# Patient Record
Sex: FEMALE | Race: WHITE
[De-identification: names, ages, dates, MRNs, and addresses within clinical notes are randomized per-mention and may not be internally consistent; named-entity substitution may affect disease eponyms.]

---

## 2019-01-03 ENCOUNTER — HOSPITAL ENCOUNTER (OUTPATIENT)
Dept: HOSPITAL 92 - SDC | Age: 24
Discharge: HOME | End: 2019-01-03
Attending: OTOLARYNGOLOGY
Payer: COMMERCIAL

## 2019-01-03 VITALS — BODY MASS INDEX: 18.2 KG/M2

## 2019-01-03 DIAGNOSIS — J35.01: Primary | ICD-10-CM

## 2019-01-03 LAB
PREGS CONTROL BACKGROUND?: (no result)
PREGS CONTROL BAR APPEAR?: YES

## 2019-01-03 PROCEDURE — 84703 CHORIONIC GONADOTROPIN ASSAY: CPT

## 2019-01-03 PROCEDURE — 0CTPXZZ RESECTION OF TONSILS, EXTERNAL APPROACH: ICD-10-PCS | Performed by: OTOLARYNGOLOGY

## 2019-01-03 PROCEDURE — 85014 HEMATOCRIT: CPT

## 2019-01-03 PROCEDURE — 36415 COLL VENOUS BLD VENIPUNCTURE: CPT

## 2019-01-03 PROCEDURE — 96374 THER/PROPH/DIAG INJ IV PUSH: CPT

## 2019-01-03 PROCEDURE — 88304 TISSUE EXAM BY PATHOLOGIST: CPT

## 2019-01-03 NOTE — OP
DATE OF PROCEDURE:  01/03/2019



PREOPERATIVE DIAGNOSES:  Chronic tonsillitis, recurrent tonsillitis.



POSTOPERATIVE DIAGNOSES:  Chronic tonsillitis, recurrent tonsillitis.



PROCEDURE PERFORMED:  Tonsillectomy over 12 years of age.



PROCEDURE IN DETAIL:  After consent was obtained, the patient was identified,

brought to the operating room, and placed on the operating table in the supine

position.  General endotracheal anesthesia and intravenous access was obtained and

we proceeded with positioning the patient for oropharyngeal surgery. 



Oropharyngeal exposure was obtained with a Christoph-Derek mouth gag after a head drape

was placed and secured with a towel clip.  The Christoph-Derek mouth gag was then

suspended from the Butler tray and palatal elevation was achieved with a red rubber

catheter.  The right tonsil was addressed first.  We used a curved Allis to grasp

the tonsil and retract it medially as an anterior pillar incision was made with a

#12 blade.  The retrotonsillar fascial plane was then established and blunt

dissection was performed with the suction cautery.  Blood vessels were anticipated,

identified, and cauterized as they were encountered.  Ultimately, dissection was

carried to the posterior tonsillar pillar mucosa which was incised hemostatically,

as well as the base of tongue connection. 

The tonsil was then passed off as a specimen and bleeding points within the

tonsillar bed were cauterized under direct visualization. 



We subsequently turned our attention to the contralateral side, where using a

similar technique, a near identical procedure was performed.  Again, the tonsil was

grasped and retracted medially with a curved Allis as an anterior pillar incision

was made with a #12 blade.  The retrotonsillar fascial plane was established and

while the anterior pillar was retracted medially, the hemostatic blunt dissection of

the tonsil with a suction cautery was performed with blood vessels anticipated,

identified, and cauterized as they were encountered.  Again, dissection continued to

the base of tongue and posterior tonsillar pillar mucosa which was incised in a

hemostatic fashion.  The tonsillar beds were then carefully inspected and bleeding

points were identified and cauterized with a suction cautery.  After this portion of

the procedure, hemostasis was completely obtained.  The patient's oral cavity was

copiously irrigated with iced saline and subsequently suctioned.  We then used the

red rubber catheter to suction the gastric contents and the patient was subsequently

aroused, awakened, and extubated without difficulty and transported to the recovery

room in stable condition.  There were no complications. 







Job ID:  [id number]